# Patient Record
Sex: FEMALE | Race: WHITE | Employment: OTHER | URBAN - METROPOLITAN AREA
[De-identification: names, ages, dates, MRNs, and addresses within clinical notes are randomized per-mention and may not be internally consistent; named-entity substitution may affect disease eponyms.]

---

## 2017-02-16 ENCOUNTER — OFFICE VISIT (OUTPATIENT)
Dept: FAMILY MEDICINE CLINIC | Age: 22
End: 2017-02-16

## 2017-02-16 VITALS
RESPIRATION RATE: 18 BRPM | HEART RATE: 93 BPM | DIASTOLIC BLOOD PRESSURE: 81 MMHG | OXYGEN SATURATION: 100 % | BODY MASS INDEX: 19.32 KG/M2 | SYSTOLIC BLOOD PRESSURE: 108 MMHG | TEMPERATURE: 97.1 F | WEIGHT: 105 LBS | HEIGHT: 62 IN

## 2017-02-16 DIAGNOSIS — R23.3 BRUISES EASILY: ICD-10-CM

## 2017-02-16 DIAGNOSIS — R21 RASH: Primary | ICD-10-CM

## 2017-02-16 PROBLEM — N39.0 RECURRENT UTI: Status: ACTIVE | Noted: 2017-02-16

## 2017-02-16 PROBLEM — F31.9 BIPOLAR 1 DISORDER (HCC): Status: ACTIVE | Noted: 2017-02-16

## 2017-02-16 PROBLEM — I10 ESSENTIAL HYPERTENSION: Status: ACTIVE | Noted: 2017-02-16

## 2017-02-16 PROBLEM — F42.9 OCD (OBSESSIVE COMPULSIVE DISORDER): Status: ACTIVE | Noted: 2017-02-16

## 2017-02-16 PROBLEM — F84.0 AUTISM SPECTRUM DISORDER: Status: ACTIVE | Noted: 2017-02-16

## 2017-02-16 PROBLEM — Q62.5 DUPLICATED URINARY COLLECTING SYSTEM: Status: ACTIVE | Noted: 2017-02-16

## 2017-02-16 PROBLEM — Z87.448 H/O KIDNEY DISEASE: Status: ACTIVE | Noted: 2017-02-16

## 2017-02-16 PROBLEM — F90.2 ATTENTION DEFICIT HYPERACTIVITY DISORDER (ADHD), COMBINED TYPE: Status: ACTIVE | Noted: 2017-02-16

## 2017-02-16 PROBLEM — F21 SCHIZOTYPAL DISORDER (HCC): Status: ACTIVE | Noted: 2017-02-16

## 2017-02-16 RX ORDER — TRIAMCINOLONE ACETONIDE 1 MG/G
OINTMENT TOPICAL 2 TIMES DAILY
Qty: 30 G | Refills: 0 | Status: SHIPPED | OUTPATIENT
Start: 2017-02-16 | End: 2017-03-16 | Stop reason: SDUPTHER

## 2017-02-16 NOTE — MR AVS SNAPSHOT
Visit Information Date & Time Provider Department Dept. Phone Encounter #  
 2/16/2017  2:30 PM Ramone Figueroa MD [address] [facility] [phone] [id number] Follow-up Instructions Return in about 1 month (around 3/16/2017) for SKIN bruising . Your Appointments 3/16/2017 10:00 AM  
ROUTINE CARE with Ramone Figueroa MD  
2813 [facility] ([address]) Appt Note: Return in about 1 month (around 3/16/2017) for SKIN bruising [address] [facility] [address] [address]  
[phone]  
  
   
 [address] [facility] [address] Upcoming Health Maintenance Date Due  
 HPV AGE 9Y-34Y (1 of 3 - Female 3 Dose Series) 9/12/2004 DTaP/Tdap/Td series (1 - Tdap) 9/12/2014 PAP AKA CERVICAL CYTOLOGY 9/12/2014 INFLUENZA AGE 9 TO ADULT 8/1/2016 Allergies as of 2/16/2017  Review Complete On: 2/16/2017 By: Ramone Figueroa MD  
 No Known Allergies Current Immunizations  Never Reviewed No immunizations on file. Not reviewed this visit You Were Diagnosed With   
  
 Codes Comments Rash    -  Primary ICD-10-CM: R21 
ICD-9-CM: 782.1 Bruises easily     ICD-10-CM: R23.8 ICD-9-CM: 457. 9 Vitals BP Pulse Temp Resp Height(growth percentile) Weight(growth percentile) 108/81 93 97.1 °F (36.2 °C) (Oral) 18 5' 2\" (1.575 m) 105 lb (47.6 kg) LMP SpO2 BMI OB Status Smoking Status 02/16/2017 100% 19.2 kg/m2 Having regular periods Never Smoker Vitals History BMI and BSA Data Body Mass Index Body Surface Area  
 19.2 kg/m 2 1.44 m 2 Preferred Pharmacy Pharmacy Name Phone [facility]. AT [address]  [phone] Your Updated Medication List  
  
   
This list is accurate as of: 2/16/17  3:02 PM.  Always use your most recent med list.  
  
  
  
  
 triamcinolone acetonide 0.1 % ointment Commonly known as:  KENALOG Apply  to affected area two (2) times a day. use thin layer Prescriptions Sent to Pharmacy Refills  
 triamcinolone acetonide (KENALOG) 0.1 % ointment 0 Sig: Apply  to affected area two (2) times a day. use thin layer Class: Normal  
 Pharmacy: [address], [address] [address] Ph #: [phone] Route: Topical  
  
Follow-up Instructions Return in about 1 month (around 3/16/2017) for SKIN bruising . Introducing [address]! Marcio Adhikari introduces Steps[employer] patient portal. Now you can access parts of your medical record, email your doctor's office, and request medication refills online. 1. In your internet browser, go to [email or web address] 2. Click on the First Time User? Click Here link in the Sign In box. You will see the New Member Sign Up page. 3. Enter your Stepsss Access Code exactly as it appears below. You will not need to use this code after youve completed the sign-up process. If you do not sign up before the expiration date, you must request a new code. · Stepsss Access Code: [email or web address] Expires: 5/17/2017  2:19 PM 
 
4. Enter the last four digits of your Social Security Number (xxxx) and Date of Birth (mm/dd/yyyy) as indicated and click Submit. You will be taken to the next sign-up page. 5. Create a [employer] ID. This will be your Stepsss login ID and cannot be changed, so think of one that is secure and easy to remember. 6. Create a Stepsss password. You can change your password at any time. 7. Enter your Password Reset Question and Answer. This can be used at a later time if you forget your password. 8. Enter your e-mail address. You will receive e-mail notification when new information is available in [address] 9. Click Sign Up. You can now view and download portions of your medical record. 10. Click the Download Summary menu link to download a portable copy of your medical information. If you have questions, please visit the Frequently Asked Questions section of the [facility] website. Remember, [facility] is NOT to be used for urgent needs. For medical emergencies, dial 911. Now available from your iPhone and Android! Please provide this summary of care documentation to your next provider. Your primary care clinician is listed as Janet Nicole. If you have any questions after today's visit, please call [phone].

## 2017-02-16 NOTE — PROGRESS NOTES
HISTORY OF PRESENT ILLNESS  Emperatriz Velez is a 19 y.o. female. HPI Comments: Patient is here to establish care. She is currently under the care of [facility]. She has been placed on medication from her kidney specialists. She has recently been started on buspirone. She has developed some bruises all over her leg and body. The appear to be dark in color and are slowly starting to faint off. He mother mentions that she may have used a body wash which may have triggered the rash on her back and buttocks. This rash is typical of contact dermatitis and patient herself mentions she likes using scented lotions and soaps which make her skin dry. I have advised for now to use a topical steroid on the crusted lesions and to use a mild moisturizing cream with not to much chemicals. Establish Care   The history is provided by the patient. This is a new problem. The problem occurs constantly. The problem has not changed since onset. Pertinent negatives include no chest pain, no abdominal pain, no headaches and no shortness of breath. Nothing aggravates the symptoms. Nothing relieves the symptoms. She has tried nothing for the symptoms. Other   The history is provided by the patient. This is a recurrent problem. The problem occurs constantly. The problem has not changed since onset. Pertinent negatives include no chest pain, no abdominal pain, no headaches and no shortness of breath. The symptoms are aggravated by stress. Nothing relieves the symptoms. She has tried nothing for the symptoms. Review of Systems   Constitutional: Negative for chills, fever and malaise/fatigue. HENT: Negative for congestion, ear discharge, ear pain, hearing loss and sore throat. Eyes: Negative for blurred vision, double vision and discharge. Respiratory: Negative for cough, sputum production, shortness of breath and wheezing. Cardiovascular: Negative for chest pain, palpitations and leg swelling. Gastrointestinal: Negative for abdominal pain, constipation and diarrhea. Genitourinary: Negative for dysuria. Musculoskeletal: Negative for joint pain. Skin: Positive for itching and rash. Multiple bruises on the legs. Small crusted lesions on back and buttocks   Neurological: Negative for dizziness, tremors, weakness and headaches. Endo/Heme/Allergies: Negative for environmental allergies and polydipsia. Bruises/bleeds easily. Psychiatric/Behavioral: The patient is not nervous/anxious and does not have insomnia. Physical Exam   Constitutional: She is oriented to person, place, and time. She appears well-developed and well-nourished. No distress. HENT:   Head: Normocephalic and atraumatic. Right Ear: External ear normal.   Left Ear: External ear normal.   Mouth/Throat: Oropharynx is clear and moist.   Eyes: EOM are normal. Pupils are equal, round, and reactive to light. No scleral icterus. Neck: Normal range of motion. No thyromegaly present. Cardiovascular: Normal rate, regular rhythm and normal heart sounds. Pulmonary/Chest: Effort normal and breath sounds normal. No respiratory distress. She has no wheezes. Abdominal: Soft. Bowel sounds are normal. She exhibits no distension. There is no tenderness. Lymphadenopathy:     She has no cervical adenopathy. Neurological: She is alert and oriented to person, place, and time. Skin: Rash noted. There is erythema. Patient has bruises on her legs   Psychiatric: She has a normal mood and affect.        ASSESSMENT and PLAN  Skin bruises :  - Reviewed medications with patient   - Blood work has been ordered   - Patient does not move her legs around at night    Contact dermatitis :   - Wash your skin  with lots of water to remove any traces of the irritant that may remain on the skin.    -You should avoid further exposure to known irritants or allergens.   -Emollients or moisturizers to keep the skin moist, and also help skin repair itself.s.   - Begin to use Corticosteroid skin creams or ointments may reduce inflammation, do not over use topical take a break after five continuous days of use. - Alternative to steroids can be tacrolimus cream  -In severe cases, corticosteroid pills may be needed. You will start them on a high dose, which is tapered gradually over about 12 days.   -Wet dressings and soothing anti-itch (antipruritic) or drying lotions may be recommended to reduce other symptoms.

## 2017-02-16 NOTE — PROGRESS NOTES
Chief Complaint   Patient presents with   [address]    Other     Bruises on her legs     A couple of months ago. 1. Have you been to the ER, urgent care clinic since your last visit? Hospitalized since your last visit? No    2. Have you seen or consulted any other health care providers outside of the [address] [place] [address] since your last visit? Include any pap smears or colon screening.  No

## 2017-02-17 LAB
ALBUMIN SERPL-MCNC: 4 G/DL (ref 3.5–5.5)
ALBUMIN/GLOB SERPL: 1.7 {RATIO} (ref 1.1–2.5)
ALP SERPL-CCNC: 54 IU/L (ref 39–117)
ALT SERPL-CCNC: 12 IU/L (ref 0–32)
AST SERPL-CCNC: 20 IU/L (ref 0–40)
BASOPHILS # BLD AUTO: 0 X10E3/UL (ref 0–0.2)
BASOPHILS NFR BLD AUTO: 1 %
BILIRUB SERPL-MCNC: <0.2 MG/DL (ref 0–1.2)
BUN SERPL-MCNC: 19 MG/DL (ref 6–20)
BUN/CREAT SERPL: 11 (ref 8–20)
CALCIUM SERPL-MCNC: 8.7 MG/DL (ref 8.7–10.2)
CHLORIDE SERPL-SCNC: 102 MMOL/L (ref 96–106)
CO2 SERPL-SCNC: 23 MMOL/L (ref 18–29)
CREAT SERPL-MCNC: 1.68 MG/DL (ref 0.57–1)
EOSINOPHIL # BLD AUTO: 0.1 X10E3/UL (ref 0–0.4)
EOSINOPHIL NFR BLD AUTO: 2 %
ERYTHROCYTE [DISTWIDTH] IN BLOOD BY AUTOMATED COUNT: 13.6 % (ref 12.3–15.4)
GLOBULIN SER CALC-MCNC: 2.4 G/DL (ref 1.5–4.5)
GLUCOSE SERPL-MCNC: 74 MG/DL (ref 65–99)
HCT VFR BLD AUTO: 37.2 % (ref 34–46.6)
HGB BLD-MCNC: 12.4 G/DL (ref 11.1–15.9)
IMM GRANULOCYTES # BLD: 0 X10E3/UL (ref 0–0.1)
IMM GRANULOCYTES NFR BLD: 0 %
LYMPHOCYTES # BLD AUTO: 2.1 X10E3/UL (ref 0.7–3.1)
LYMPHOCYTES NFR BLD AUTO: 38 %
MCH RBC QN AUTO: 28.9 PG (ref 26.6–33)
MCHC RBC AUTO-ENTMCNC: 33.3 G/DL (ref 31.5–35.7)
MCV RBC AUTO: 87 FL (ref 79–97)
MONOCYTES # BLD AUTO: 0.5 X10E3/UL (ref 0.1–0.9)
MONOCYTES NFR BLD AUTO: 8 %
NEUTROPHILS # BLD AUTO: 2.9 X10E3/UL (ref 1.4–7)
NEUTROPHILS NFR BLD AUTO: 51 %
PLATELET # BLD AUTO: 197 X10E3/UL (ref 150–379)
POTASSIUM SERPL-SCNC: 4.6 MMOL/L (ref 3.5–5.2)
PROT SERPL-MCNC: 6.4 G/DL (ref 6–8.5)
RBC # BLD AUTO: 4.29 X10E6/UL (ref 3.77–5.28)
SODIUM SERPL-SCNC: 141 MMOL/L (ref 134–144)
TSH SERPL DL<=0.005 MIU/L-ACNC: 4.72 UIU/ML (ref 0.45–4.5)
WBC # BLD AUTO: 5.6 X10E3/UL (ref 3.4–10.8)

## 2017-03-16 ENCOUNTER — OFFICE VISIT (OUTPATIENT)
Dept: FAMILY MEDICINE CLINIC | Age: 22
End: 2017-03-16

## 2017-03-16 ENCOUNTER — HOSPITAL ENCOUNTER (OUTPATIENT)
Dept: LAB | Age: 22
Discharge: HOME | End: 2017-03-16
Payer: MEDICAID

## 2017-03-16 VITALS
HEIGHT: 62 IN | SYSTOLIC BLOOD PRESSURE: 109 MMHG | RESPIRATION RATE: 16 BRPM | WEIGHT: 110 LBS | DIASTOLIC BLOOD PRESSURE: 74 MMHG | TEMPERATURE: 97.4 F | HEART RATE: 83 BPM | BODY MASS INDEX: 20.24 KG/M2 | OXYGEN SATURATION: 96 %

## 2017-03-16 DIAGNOSIS — R79.89 ELEVATED TSH: ICD-10-CM

## 2017-03-16 DIAGNOSIS — Z87.898 HISTORY OF BRUISING EASILY: ICD-10-CM

## 2017-03-16 DIAGNOSIS — L30.9 ECZEMA, UNSPECIFIED TYPE: Primary | ICD-10-CM

## 2017-03-16 LAB
T4 FREE SERPL-MCNC: 1 NG/DL (ref 0.7–1.5)
TSH SERPL DL<=0.05 MIU/L-ACNC: 3.09 UIU/ML (ref 0.36–3.74)

## 2017-03-16 PROCEDURE — 86376 MICROSOMAL ANTIBODY EACH: CPT | Performed by: FAMILY MEDICINE

## 2017-03-16 PROCEDURE — 84480 ASSAY TRIIODOTHYRONINE (T3): CPT | Performed by: FAMILY MEDICINE

## 2017-03-16 PROCEDURE — 84439 ASSAY OF FREE THYROXINE: CPT | Performed by: FAMILY MEDICINE

## 2017-03-16 PROCEDURE — 84443 ASSAY THYROID STIM HORMONE: CPT | Performed by: FAMILY MEDICINE

## 2017-03-16 RX ORDER — TRIAMCINOLONE ACETONIDE 1 MG/G
OINTMENT TOPICAL 2 TIMES DAILY
Qty: 30 G | Refills: 0 | Status: SHIPPED | OUTPATIENT
Start: 2017-03-16 | End: 2017-07-24

## 2017-03-16 NOTE — PROGRESS NOTES
Monet Medley is a 19 y.o. female presents to office for skin bruising. 1. Have you been to the ER, urgent care clinic or hospitalized since your last visit? no  2. Have you seen any other providers outside of [facility] since your last visit? no  3.  Have you had a Flu shot this year? no       Health Maintenance items with a due date reviewed with patient:  Health Maintenance Due   Topic Date Due    HPV AGE 9Y-26Y (1 of 3 - Female 3 Dose Series) 09/12/2004    DTaP/Tdap/Td series (1 - Tdap) 09/12/2014    PAP AKA CERVICAL CYTOLOGY  09/12/2014    INFLUENZA AGE 9 TO ADULT  08/01/2016

## 2017-03-16 NOTE — MR AVS SNAPSHOT
Visit Information Date & Time Provider Department Dept. Phone Encounter #  
 3/16/2017 10:00 AM Shantelle Adrian MD [address] [facility] [phone] [id number] Follow-up Instructions Return in about 2 months (around 5/16/2017) for follow up. Your Appointments 3/30/2017  2:15 PM  
New Patient with Mala Abel MD  
[facility]. [facility]) Appt Note: recurrent UTI/ prior obsturction/ prior reconstruction [facility]/ Dr Miles Mendoza/ [place] [address] [address]  
[phone]  
  
   
 [place] [id number] Upcoming Health Maintenance Date Due  
 HPV AGE 9Y-34Y (1 of 3 - Female 3 Dose Series) 9/12/2004 DTaP/Tdap/Td series (1 - Tdap) 9/12/2014 PAP AKA CERVICAL CYTOLOGY 9/12/2014 INFLUENZA AGE 9 TO ADULT 8/1/2016 Allergies as of 3/16/2017  Review Complete On: 2/16/2017 By: Shantelle Adrian MD  
 No Known Allergies Current Immunizations  Never Reviewed No immunizations on file. Not reviewed this visit You Were Diagnosed With   
  
 Codes Comments Eczema, unspecified type    -  Primary ICD-10-CM: L30.9 ICD-9-CM: 692.9 Elevated TSH     ICD-10-CM: R94.6 ICD-9-CM: 794.5 History of bruising easily     ICD-10-CM: Z86.2 ICD-9-CM: V13.9 Vitals BP Pulse Temp Resp Height(growth percentile) Weight(growth percentile) 109/74 (BP 1 Location: Left arm, BP Patient Position: Sitting) 83 97.4 °F (36.3 °C) (Oral) 16 5' 2\" (1.575 m) 110 lb (49.9 kg) LMP SpO2 BMI OB Status Smoking Status 02/16/2017 96% 20.12 kg/m2 Having regular periods Never Smoker Vitals History BMI and BSA Data Body Mass Index Body Surface Area  
 20.12 kg/m 2 1.48 m 2 Preferred Pharmacy Pharmacy Name Phone [facility]. AT [facility]   [phone] Your Updated Medication List  
  
   
This list is accurate as of: 3/16/17 10:33 AM.  Always use your most recent med list.  
  
  
  
  
 triamcinolone acetonide 0.1 % ointment Commonly known as:  KENALOG Apply  to affected area two (2) times a day. use thin layer Prescriptions Sent to Pharmacy Refills  
 triamcinolone acetonide (KENALOG) 0.1 % ointment 0 Sig: Apply  to affected area two (2) times a day. use thin layer Class: Normal  
 Pharmacy: [address], [address] [address] Ph #: [phone] Route: Topical  
  
We Performed the Following NM COLLECTION VENOUS BLOOD,VENIPUNCTURE [id number] CPT(R)] Follow-up Instructions Return in about 2 months (around 5/16/2017) for follow up. Patient Instructions Please follow up in 2 months with Dr. Tereza Mc. Once your labs come back we will you. Introducing [facility]! [facility] introduces [employer] patient portal. Now you can access parts of your medical record, email your doctor's office, and request medication refills online. 1. In your internet browser, go to [email or web address] 2. Click on the First Time User? Click Here link in the Sign In box. You will see the New Member Sign Up page. 3. Enter your [employer] Access Code exactly as it appears below. You will not need to use this code after youve completed the sign-up process. If you do not sign up before the expiration date, you must request a new code. · [employer] Access Code: [email or web address] Expires: 5/17/2017  3:19 PM 
 
4. Enter the last four digits of your Social Security Number (xxxx) and Date of Birth (mm/dd/yyyy) as indicated and click Submit. You will be taken to the next sign-up page. 5. Create a [employer] ID. This will be your [employer] login ID and cannot be changed, so think of one that is secure and easy to remember. 6. Create a [employer] password. You can change your password at any time. 7. Enter your Password Reset Question and Answer. This can be used at a later time if you forget your password. 8. Enter your e-mail address. You will receive e-mail notification when new information is available in [address] 9. Click Sign Up. You can now view and download portions of your medical record. 10. Click the Download Summary menu link to download a portable copy of your medical information. If you have questions, please visit the Frequently Asked Questions section of the [employer] website. Remember, [employer] is NOT to be used for urgent needs. For medical emergencies, dial 911. Now available from your iPhone and Android! Please provide this summary of care documentation to your next provider. Your primary care clinician is listed as Janet Cabrera. If you have any questions after today's visit, please call [phone].

## 2017-03-16 NOTE — PROGRESS NOTES
HISTORY OF PRESENT ILLNESS  Ken Bustillo is a 19 y.o. female. HPI Comments: Patients skin appears to be looking better after she has changed from perfume creams to simple cream. She just got a kitten which has caused her to have some mild scratch marks. I ave advised mom to use Aquaphor on these marks. Her mother is concerned about the bruises and I have advised her that her TSH is slightly high and will need to repated today along with other thyroid testing. Her mother mentions thyroid disorder runs in the family and it has always been high. She is on psych medications and I have explained that it is important for her psychiatrist to know about these lab results as well. She verbalizes an understanding. Skin Problem   The history is provided by the patient. This is a recurrent problem. The problem occurs constantly. The problem has been gradually improving. Pertinent negatives include no chest pain, no abdominal pain, no headaches and no shortness of breath. The symptoms are aggravated by stress (thyroid elevation). Nothing relieves the symptoms. She has tried nothing for the symptoms. Thyroid Problem   The history is provided by the patient. This is a chronic problem. The problem occurs constantly. The problem has not changed since onset. Pertinent negatives include no chest pain, no abdominal pain, no headaches and no shortness of breath. Nothing aggravates the symptoms. Nothing relieves the symptoms. She has tried nothing for the symptoms. Review of Systems   Constitutional: Negative for chills, diaphoresis, fever and malaise/fatigue. HENT: Negative for congestion, ear pain, hearing loss and sore throat. Eyes: Negative for blurred vision, pain and discharge. Respiratory: Negative for cough, sputum production, shortness of breath and wheezing. Cardiovascular: Negative for chest pain, palpitations and leg swelling.    Gastrointestinal: Negative for abdominal pain, constipation, diarrhea, nausea and vomiting. Genitourinary: Negative for dysuria. Musculoskeletal: Negative for joint pain and myalgias. Skin:        Cat scratch marks on skin of arms    Neurological: Negative for dizziness, focal weakness, weakness and headaches. Endo/Heme/Allergies: Negative for environmental allergies. Psychiatric/Behavioral: Negative for depression and substance abuse. The patient is not nervous/anxious. Visit Vitals    /74 (BP 1 Location: Left arm, BP Patient Position: Sitting)    Pulse 83    Temp 97.4 °F (36.3 °C) (Oral)    Resp 16    Ht 5' 2\" (1.575 m)    Wt 110 lb (49.9 kg)    SpO2 96%    BMI 20.12 kg/m2       Physical Exam   Constitutional: She is oriented to person, place, and time. She appears well-developed and well-nourished. No distress. HENT:   Head: Normocephalic and atraumatic. Right Ear: External ear normal.   Left Ear: External ear normal.   Mouth/Throat: Oropharynx is clear and moist.   Eyes: EOM are normal. Pupils are equal, round, and reactive to light. No scleral icterus. Neck: Normal range of motion. No thyromegaly present. Cardiovascular: Normal rate, regular rhythm and normal heart sounds. Pulmonary/Chest: Effort normal and breath sounds normal.   Abdominal: Soft. Bowel sounds are normal. She exhibits no distension. There is no tenderness. Lymphadenopathy:     She has no cervical adenopathy. Neurological: She is alert and oriented to person, place, and time. Psychiatric: She has a normal mood and affect.        ASSESSMENT and PLAN  Eczema:  - Blood work ordered   - Continue to use mild moisturzing cream  Such as cerave, cetaphil or lubiderm , and mild soaps such as dove  - Apply steroid cream to affected areas twice a day for 5-7 days , take a break in between  - Please begin elidel cream  - Consider dermatology referral if not improving    Elevated TSH:  - Blood work to  been repeated

## 2017-03-17 LAB
T3 SERPL-MCNC: 76 NG/DL (ref 71–180)
THYROGLOB AB SERPL-ACNC: <1 IU/ML (ref 0–0.9)
THYROPEROXIDASE AB SERPL-ACNC: 8 IU/ML (ref 0–34)

## 2017-05-17 ENCOUNTER — OFFICE VISIT (OUTPATIENT)
Dept: FAMILY MEDICINE CLINIC | Age: 22
End: 2017-05-17

## 2017-05-17 VITALS
RESPIRATION RATE: 16 BRPM | BODY MASS INDEX: 20.72 KG/M2 | HEIGHT: 62 IN | DIASTOLIC BLOOD PRESSURE: 66 MMHG | TEMPERATURE: 98 F | OXYGEN SATURATION: 95 % | WEIGHT: 112.6 LBS | HEART RATE: 83 BPM | SYSTOLIC BLOOD PRESSURE: 102 MMHG

## 2017-05-17 DIAGNOSIS — R05.9 COUGH: Primary | ICD-10-CM

## 2017-05-17 DIAGNOSIS — J01.10 ACUTE NON-RECURRENT FRONTAL SINUSITIS: ICD-10-CM

## 2017-05-17 RX ORDER — AZITHROMYCIN 250 MG/1
TABLET, FILM COATED ORAL
Qty: 6 TAB | Refills: 0 | Status: SHIPPED | OUTPATIENT
Start: 2017-05-17 | End: 2017-05-22

## 2017-05-17 RX ORDER — BENZONATATE 100 MG/1
100 CAPSULE ORAL
Qty: 21 CAP | Refills: 0 | Status: SHIPPED | OUTPATIENT
Start: 2017-05-17 | End: 2017-05-24

## 2017-05-17 NOTE — PROGRESS NOTES
HISTORY OF PRESENT ILLNESS  Celina White is a 19 y.o. female. Cough   The history is provided by the patient. This is a new problem. The current episode started more than 1 week ago. The problem occurs constantly. The problem has been gradually worsening. Associated symptoms include headaches. Pertinent negatives include no chest pain, no abdominal pain and no shortness of breath. The symptoms are aggravated by stress. Nothing relieves the symptoms. She has tried nothing for the symptoms. Review of Systems   Constitutional: Positive for chills. Negative for fever and malaise/fatigue. HENT: Positive for congestion and sore throat. Negative for ear pain, nosebleeds and tinnitus. Eyes: Negative for blurred vision, double vision, pain and discharge. Respiratory: Positive for cough. Negative for sputum production, shortness of breath and wheezing. Cardiovascular: Negative for chest pain, palpitations and leg swelling. Gastrointestinal: Negative for abdominal pain, diarrhea, nausea and vomiting. Genitourinary: Negative for dysuria and urgency. Musculoskeletal: Negative for joint pain. Neurological: Positive for headaches. Negative for dizziness, tingling and weakness. Psychiatric/Behavioral: The patient is not nervous/anxious. Visit Vitals    /66    Pulse 83    Temp 98 °F (36.7 °C)    Resp 16    Ht 5' 2\" (1.575 m)    Wt 112 lb 9.6 oz (51.1 kg)    SpO2 95%    BMI 20.59 kg/m2       Physical Exam   Constitutional: She is oriented to person, place, and time. She appears well-developed and well-nourished. No distress. HENT:   Head: Normocephalic and atraumatic. Nose: Mucosal edema and rhinorrhea present. Right sinus exhibits maxillary sinus tenderness and frontal sinus tenderness. Left sinus exhibits maxillary sinus tenderness and frontal sinus tenderness. Mouth/Throat: Posterior oropharyngeal erythema present.    Eyes: EOM are normal. Pupils are equal, round, and reactive to light. No scleral icterus. Neck: Normal range of motion. No thyromegaly present. Cardiovascular: Normal rate, regular rhythm and normal heart sounds. Pulmonary/Chest: Effort normal and breath sounds normal. She has no wheezes. Abdominal: Soft. Bowel sounds are normal. She exhibits no distension. There is no tenderness. Lymphadenopathy:     She has no cervical adenopathy. Neurological: She is alert and oriented to person, place, and time. Psychiatric: She has a normal mood and affect. ASSESSMENT and PLAN  Sinusitis :  1) Ok to take tylenol / motrin to relieve pain. 2) Please finish course of antibiotics , explained side effects and patient verbalizes understanding. 3) Mucolytic's such as guaifenesin which can thin out the mucous. 4) Use nasal steroid inhaler and anti-allergy medication. 5) Can use nasal saline spray or Neti-pot. 6) Please keep a humidifier in your bedroom. 7) Patient instructions and information on diagnosis to be handed out.

## 2017-05-17 NOTE — PROGRESS NOTES
Chief Complaint   Patient presents with    Cough     Pt states her cough has not gotten any better. 1. Have you been to the ER, urgent care clinic since your last visit? Hospitalized since your last visit? No    2. Have you seen or consulted any other health care providers outside of the [address] [place] [address] since your last visit? Include any pap smears or colon screening.  No

## 2017-07-24 PROBLEM — Q63.9 CONGENITAL ANOMALY OF KIDNEY: Status: ACTIVE | Noted: 2017-07-24

## 2017-08-03 ENCOUNTER — OFFICE VISIT (OUTPATIENT)
Dept: FAMILY MEDICINE CLINIC | Age: 22
End: 2017-08-03

## 2017-08-03 VITALS
HEART RATE: 78 BPM | WEIGHT: 117 LBS | BODY MASS INDEX: 21.53 KG/M2 | TEMPERATURE: 98.8 F | RESPIRATION RATE: 16 BRPM | SYSTOLIC BLOOD PRESSURE: 105 MMHG | HEIGHT: 62 IN | OXYGEN SATURATION: 100 % | DIASTOLIC BLOOD PRESSURE: 80 MMHG

## 2017-08-03 DIAGNOSIS — Z12.4 PAP SMEAR FOR CERVICAL CANCER SCREENING: ICD-10-CM

## 2017-08-03 DIAGNOSIS — N92.6 IRREGULAR PERIODS/MENSTRUAL CYCLES: Primary | ICD-10-CM

## 2017-08-03 NOTE — PROGRESS NOTES
HISTORY OF PRESENT ILLNESS  Rashel Schroeder is a 19 y.o. female. HPI Comments:  Patient is here today as she is concerned that her last cycle only lasted 2 days and normal it is 5 days and comes every month. There is a concern due to ovarian / endometrial cancer in the family. She is due to have a pap smear and would like to have one doen today as her last one was when she was 11. I will do a PAP smear in the office and I have advised her to monitor for her next cycle and how long it lasts. Irregular Menses   The history is provided by the patient. This is a new problem. The current episode started more than 1 week ago. The problem occurs constantly. The problem has not changed since onset. Pertinent negatives include no chest pain, no abdominal pain, no headaches and no shortness of breath. The symptoms are aggravated by stress. Nothing relieves the symptoms. She has tried nothing for the symptoms. Review of Systems   Constitutional: Negative for chills, fever and malaise/fatigue. HENT: Negative for ear pain, nosebleeds and sore throat. Eyes: Negative for blurred vision, double vision and pain. Respiratory: Negative for cough, sputum production, shortness of breath and wheezing. Cardiovascular: Negative for chest pain, palpitations and leg swelling. Gastrointestinal: Negative for abdominal pain, constipation, nausea and vomiting. Genitourinary: Negative for dysuria, frequency and hematuria. Musculoskeletal: Negative for joint pain and myalgias. Neurological: Negative for dizziness, tingling, tremors, focal weakness, weakness and headaches. Psychiatric/Behavioral: Negative for depression. The patient is nervous/anxious.       Visit Vitals    /80 (BP 1 Location: Left arm, BP Patient Position: Sitting)    Pulse 78    Temp 98.8 °F (37.1 °C) (Oral)    Resp 16    Ht 5' 2\" (1.575 m)    Wt 117 lb (53.1 kg)    SpO2 100%    BMI 21.4 kg/m2       Physical Exam   Constitutional: She is oriented to person, place, and time. She appears well-developed and well-nourished. No distress. HENT:   Head: Normocephalic and atraumatic. Right Ear: External ear normal.   Left Ear: External ear normal.   Mouth/Throat: Oropharynx is clear and moist.   Eyes: EOM are normal. Pupils are equal, round, and reactive to light. No scleral icterus. Neck: Normal range of motion. No thyromegaly present. Cardiovascular: Normal rate, regular rhythm and normal heart sounds. Pulmonary/Chest: Effort normal and breath sounds normal. No respiratory distress. She has no wheezes. Abdominal: Soft. Bowel sounds are normal. She exhibits no distension. There is no tenderness. Lymphadenopathy:     She has no cervical adenopathy. Neurological: She is alert and oriented to person, place, and time. Psychiatric: She has a normal mood and affect.        ASSESSMENT and PLAN  Irregular cycle:  - Please monitor for next cycle and the length   - Pap smear in office as patient is due

## 2017-08-07 LAB
BACTERIAL VAGINOSIS, NAA: NEGATIVE
CANDIDA ALBICANS, NAA, 180056: NEGATIVE
CANDIDA GLABRATA, NAA, 180057: NEGATIVE
CANDIDA KRUSEI, NAA, 180016: NEGATIVE
CHLAMYDIA TRACHOMATIS, NAA, 180097: NEGATIVE
HPV HIGH RISK, 507303: NEGATIVE
NEISSERIA GONORRHOEAE, NAA, 180104: NEGATIVE
TRICH VAG BY NAA, 180087: NEGATIVE

## 2017-08-08 LAB — PAP IMAGE GUIDED, 8900296: ABNORMAL

## 2017-08-09 ENCOUNTER — TELEPHONE (OUTPATIENT)
Dept: FAMILY MEDICINE CLINIC | Age: 22
End: 2017-08-09

## 2017-08-09 NOTE — TELEPHONE ENCOUNTER
----- Message from Nisreen Sunshine MD sent at 8/8/2017  2:27 PM EDT -----  April General her specimen for pap came back unsatisfactory and she will need to come back for a pap only.  Please inform her the rest of testing being the HPV and nuswab was normal.

## 2017-08-14 PROBLEM — N12 PYELONEPHRITIS: Status: ACTIVE | Noted: 2017-08-14

## 2017-08-17 PROBLEM — A41.9 SEPSIS (HCC): Status: ACTIVE | Noted: 2017-08-17

## 2017-08-17 PROBLEM — J18.9 PNEUMONIA: Status: ACTIVE | Noted: 2017-08-17

## 2017-08-22 ENCOUNTER — OFFICE VISIT (OUTPATIENT)
Dept: FAMILY MEDICINE CLINIC | Age: 22
End: 2017-08-22

## 2017-08-22 VITALS
DIASTOLIC BLOOD PRESSURE: 73 MMHG | HEART RATE: 83 BPM | WEIGHT: 113 LBS | OXYGEN SATURATION: 95 % | SYSTOLIC BLOOD PRESSURE: 94 MMHG | TEMPERATURE: 95.5 F | BODY MASS INDEX: 20.8 KG/M2 | RESPIRATION RATE: 16 BRPM | HEIGHT: 62 IN

## 2017-08-22 DIAGNOSIS — Z09 HOSPITAL DISCHARGE FOLLOW-UP: ICD-10-CM

## 2017-08-22 DIAGNOSIS — N39.0 URINARY TRACT INFECTION WITH HEMATURIA, SITE UNSPECIFIED: Primary | ICD-10-CM

## 2017-08-22 DIAGNOSIS — R31.9 URINARY TRACT INFECTION WITH HEMATURIA, SITE UNSPECIFIED: Primary | ICD-10-CM

## 2017-08-22 DIAGNOSIS — R79.89 ELEVATED SERUM CREATININE: ICD-10-CM

## 2017-08-22 DIAGNOSIS — N12 PYELONEPHRITIS: ICD-10-CM

## 2017-08-22 DIAGNOSIS — R87.615 ENCOUNTER FOR REPEAT PAP SMEAR DUE TO PREVIOUS INSUFFICIENT CERVICAL CELLS: ICD-10-CM

## 2017-08-22 DIAGNOSIS — J18.9 PNEUMONIA OF RIGHT LOWER LOBE DUE TO INFECTIOUS ORGANISM: ICD-10-CM

## 2017-08-22 LAB
BILIRUB UR QL STRIP: NEGATIVE
GLUCOSE UR-MCNC: NORMAL MG/DL
KETONES P FAST UR STRIP-MCNC: NEGATIVE MG/DL
PH UR STRIP: 7 [PH] (ref 4.6–8)
PROT UR QL STRIP: NORMAL MG/DL
SP GR UR STRIP: 1.02 (ref 1–1.03)
UA UROBILINOGEN AMB POC: NORMAL (ref 0.2–1)
URINALYSIS CLARITY POC: NORMAL
URINALYSIS COLOR POC: YELLOW
URINE BLOOD POC: NORMAL
URINE LEUKOCYTES POC: NEGATIVE
URINE NITRITES POC: NEGATIVE

## 2017-08-22 NOTE — LETTER
8/22/2017 9:48 AM 
 
Ms. Crystal Lee [address] [address] [address] [address] Dear Crystal Rosa: 
 
Please find your most recent results below. Resulted Orders AMB POC URINALYSIS DIP STICK AUTO W/ MICRO Result Value Ref Range Color (UA POC) Yellow Clarity (UA POC) Cloudy Glucose (UA POC)  Negative Bilirubin (UA POC) Negative Negative Ketones (UA POC) Negative Negative Specific gravity (UA POC) 1.020 1.001 - 1.035 Blood (UA POC) 2+ Negative pH (UA POC) 7.0 4.6 - 8.0 Protein (UA POC) 2+ Negative mg/dL Urobilinogen (UA POC) 0.2 mg/dL 0.2 - 1 Nitrites (UA POC) Negative Negative Leukocyte esterase (UA POC) Negative Negative

## 2017-08-22 NOTE — PROGRESS NOTES
Patient is here for hospital f/u for kidney infection and pneumonia. Patient was given a course of antibiotics. Patient needs a referral to urologist per er note. 1. Have you been to the ER, urgent care clinic since your last visit? Hospitalized since your last visit?no    2. Have you seen or consulted any other health care providers outside of the [address] [place] [address] since your last visit? Include any pap smears or colon screening.  no

## 2017-08-22 NOTE — PROGRESS NOTES
HISTORY OF PRESENT ILLNESS  Sana Contreras is a 19 y.o. female. HPI Comments: Patient is here as a hospital follow up and she was admitted on 8/14 and d/c on 8/17. 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 She went to the emergency room because of  flank pain and fever. She was diagnosed with a UTI and pyelonephritis. A renal US was done that demonstrated presence of hydronephrosis. Her creatinine was elevated at 2.1 Urology was consulted. IV antibiotics were provided. (IV rocephin) and IV fluids were maintained. Cozaar was held due to renal failure. Coreg was provided for BP control in the interim. Urology saw the patient as a consult and recommend that she have a stent placed but the family refused at that time stating that they will discuss this with outpatient urologist whom patient sees. During her hospital course her creatinine came down to 1.6 , she was given IVF and she felt better and started to eat as well. A chest Xray done on admission shows an infiltrate at the right base. And IV azithromycin was started for treatment of pneumonia. Her cough and fever did improve with treatment . On discharge she was advised to finish antibiotics and follow up with PCP and urology as an outpatient. Today I will order a urine dipstick and some blood work to check her creatinine level. She does have an appointment with her urologist today and nephrologist tomorrow to discuss the stent placement. Patient is feeling better today and still has a course of antibiotics to take which will be completed on Saturday. Patient would also like to have her PAP repeated today as there was insufficient cells collected last time and I will do this in office today. Hospital Follow Up   The history is provided by the patient. This is a new problem. The current episode started more than 1 week ago. The problem occurs constantly.  The problem has been gradually improving. Pertinent negatives include no chest pain, no abdominal pain, no headaches and no shortness of breath. Nothing aggravates the symptoms. Nothing relieves the symptoms. Treatments tried: hospitalization  The treatment provided moderate relief. Review of Systems   Constitutional: Negative for chills, fever and malaise/fatigue. HENT: Negative for congestion, ear pain, hearing loss and sore throat. Eyes: Negative for blurred vision, double vision, pain and discharge. Respiratory: Negative for cough, sputum production, shortness of breath and wheezing. Cardiovascular: Negative for chest pain, palpitations and leg swelling. Gastrointestinal: Negative for abdominal pain, nausea and vomiting. Genitourinary: Negative for dysuria, frequency and urgency. Musculoskeletal: Negative for joint pain and myalgias. Neurological: Negative for dizziness, focal weakness, weakness and headaches. Psychiatric/Behavioral: The patient is not nervous/anxious. Visit Vitals    BP 94/73    Pulse 83    Temp 95.5 °F (35.3 °C) (Oral)    Resp 16    Ht 5' 2\" (1.575 m)    Wt 113 lb (51.3 kg)    SpO2 95%    BMI 20.67 kg/m2       Physical Exam   Constitutional: She is oriented to person, place, and time. She appears well-developed and well-nourished. No distress. HENT:   Head: Normocephalic and atraumatic. Right Ear: External ear normal.   Left Ear: External ear normal.   Mouth/Throat: Oropharynx is clear and moist. No oropharyngeal exudate. Eyes: EOM are normal. Pupils are equal, round, and reactive to light. No scleral icterus. Neck: Normal range of motion. Cardiovascular: Normal rate, regular rhythm and normal heart sounds. Pulmonary/Chest: Effort normal. No respiratory distress. She has no wheezes. Abdominal: Soft. Bowel sounds are normal. She exhibits no distension. There is no tenderness.    Genitourinary: Vagina normal and uterus normal.   Lymphadenopathy:     She has no cervical adenopathy. Neurological: She is alert and oriented to person, place, and time. Psychiatric: She has a normal mood and affect. ASSESSMENT and PLAN  UTI :  1) Urine dipstick to be ordered in office today, results to be discussed with patient in office. 2) Please finish antibiotic course , side effects of medication explained to patient, patient verbalizes understanding. 3) Drink plenty of water, can also drink cranberry juice. 4) Avoid bladder irritants such as coffee, alcohol, soft drinks, citrus juices. 5) Can apply a heating pad to lower abdomen to minimize bladder pressure and discomfort. 6) Wipe from front to back. 7) Empty your bladder every half hour to one hour and after intercourse. 8) Avoid irritating feminine products at this time such as douches and powders.     Elevated serum creatinine:  - Repeat value     Pneumonia :  - Please finish antibiotics   - Repeat chest x-ray in 2 weeks    PAP smear repeat :  - Has been done in office

## 2017-08-23 LAB
HPV HIGH RISK, 507303: NEGATIVE
PAP IMAGE GUIDED, 8900296: NORMAL

## 2017-08-29 ENCOUNTER — LAB ONLY (OUTPATIENT)
Dept: FAMILY MEDICINE CLINIC | Age: 22
End: 2017-08-29

## 2017-08-29 DIAGNOSIS — Z01.89 ROUTINE LAB DRAW: Primary | ICD-10-CM

## 2017-08-30 LAB
A-G RATIO,AGRAT: 1.9 RATIO (ref 1.1–2.6)
ABSOLUTE LYMPHOCYTE COUNT, 10803: 2.1 K/UL (ref 1–4.8)
ALBUMIN SERPL-MCNC: 4.5 G/DL (ref 3.5–5)
ALP SERPL-CCNC: 67 U/L (ref 25–115)
ALT SERPL-CCNC: 42 U/L (ref 5–40)
ANION GAP SERPL CALC-SCNC: 19 MMOL/L
AST SERPL W P-5'-P-CCNC: 39 U/L (ref 10–37)
BASOPHILS # BLD: 0.1 K/UL (ref 0–0.2)
BASOPHILS NFR BLD: 1 % (ref 0–2)
BILIRUB SERPL-MCNC: 0.2 MG/DL (ref 0.2–1.2)
BUN SERPL-MCNC: 17 MG/DL (ref 6–22)
CALCIUM SERPL-MCNC: 9.1 MG/DL (ref 8.4–10.5)
CHLORIDE SERPL-SCNC: 99 MMOL/L (ref 98–110)
CO2 SERPL-SCNC: 23 MMOL/L (ref 20–32)
CREAT SERPL-MCNC: 1.7 MG/DL (ref 0.5–1.2)
EOSINOPHIL # BLD: 0.1 K/UL (ref 0–0.5)
EOSINOPHIL NFR BLD: 1 % (ref 0–6)
ERYTHROCYTE [DISTWIDTH] IN BLOOD BY AUTOMATED COUNT: 13.9 % (ref 10–16)
GFRAA, 66117: 44.5
GFRNA, 66118: 36.7
GLOBULIN,GLOB: 2.4 G/DL (ref 2–4)
GLUCOSE SERPL-MCNC: 64 MG/DL (ref 65–99)
GRANULOCYTES,GRANS: 66 % (ref 40–75)
HCT VFR BLD AUTO: 40.3 % (ref 35.1–46.5)
HGB BLD-MCNC: 12.7 G/DL (ref 11.7–15.5)
LYMPHOCYTES, LYMLT: 24 % (ref 27–45)
MCH RBC QN AUTO: 29 PG (ref 26–34)
MCHC RBC AUTO-ENTMCNC: 32 G/DL (ref 32–36)
MCV RBC AUTO: 91 FL (ref 80–95)
MONOCYTES # BLD: 0.7 K/UL (ref 0.1–0.9)
MONOCYTES NFR BLD: 8 % (ref 3–9)
NEUTROPHILS # BLD AUTO: 5.9 K/UL (ref 1.8–7.7)
PLATELET # BLD AUTO: 315 K/UL (ref 140–440)
PMV BLD AUTO: 10.4 FL (ref 6–10.8)
POTASSIUM SERPL-SCNC: 4.5 MMOL/L (ref 3.5–5.5)
PROT SERPL-MCNC: 6.9 G/DL (ref 6.4–8.3)
RBC # BLD AUTO: 4.41 M/UL (ref 3.8–5.2)
SODIUM SERPL-SCNC: 141 MMOL/L (ref 133–145)
WBC # BLD AUTO: 8.9 K/UL (ref 4–11)

## 2017-11-15 ENCOUNTER — OFFICE VISIT (OUTPATIENT)
Dept: FAMILY MEDICINE CLINIC | Age: 22
End: 2017-11-15

## 2017-11-15 VITALS
HEART RATE: 94 BPM | BODY MASS INDEX: 20.43 KG/M2 | RESPIRATION RATE: 18 BRPM | WEIGHT: 111 LBS | TEMPERATURE: 96.3 F | HEIGHT: 62 IN | DIASTOLIC BLOOD PRESSURE: 79 MMHG | OXYGEN SATURATION: 97 % | SYSTOLIC BLOOD PRESSURE: 110 MMHG

## 2017-11-15 DIAGNOSIS — J01.11 ACUTE RECURRENT FRONTAL SINUSITIS: Primary | ICD-10-CM

## 2017-11-15 NOTE — PROGRESS NOTES
HISTORY OF PRESENT ILLNESS  Rosie Siddiqi is a 23 y.o. female. Cough   The history is provided by the patient. This is a new problem. The current episode started more than 2 days ago. The problem occurs constantly. The problem has been gradually worsening. Associated symptoms include headaches. Pertinent negatives include no chest pain and no shortness of breath. Associated symptoms comments: Cough . The symptoms are aggravated by coughing. Nothing relieves the symptoms. She has tried nothing for the symptoms. Review of Systems   Constitutional: Negative for chills, fever and malaise/fatigue. HENT: Positive for congestion. Negative for hearing loss, sinus pain and sore throat. Eyes: Negative for blurred vision, double vision and pain. Respiratory: Positive for cough. Negative for sputum production, shortness of breath, wheezing and stridor. Cardiovascular: Negative for chest pain, palpitations and leg swelling. Genitourinary: Negative for dysuria, frequency and hematuria. Musculoskeletal: Negative for joint pain and myalgias. Neurological: Positive for headaches. Negative for tingling, seizures and weakness. Psychiatric/Behavioral: Negative for depression. The patient is not nervous/anxious. Visit Vitals    /79    Pulse 94    Temp 96.3 °F (35.7 °C) (Oral)    Resp 18    Ht 5' 2\" (1.575 m)    Wt 111 lb (50.3 kg)    SpO2 97%    BMI 20.3 kg/m2       Physical Exam   Constitutional: She is oriented to person, place, and time. She appears well-developed and well-nourished. No distress. HENT:   Head: Normocephalic and atraumatic. Right Ear: External ear normal.   Left Ear: External ear normal.   Nose: Right sinus exhibits maxillary sinus tenderness and frontal sinus tenderness. Left sinus exhibits maxillary sinus tenderness and frontal sinus tenderness. Mouth/Throat: Posterior oropharyngeal erythema present.    Eyes: EOM are normal. Pupils are equal, round, and reactive to light. No scleral icterus. Neck: Normal range of motion. No thyromegaly present. Cardiovascular: Normal rate, regular rhythm and normal heart sounds. Pulmonary/Chest: Effort normal and breath sounds normal. No respiratory distress. She has no wheezes. Abdominal: Soft. Bowel sounds are normal. She exhibits no distension. There is no tenderness. Lymphadenopathy:     She has no cervical adenopathy. Neurological: She is alert and oriented to person, place, and time. Psychiatric: She has a normal mood and affect. ASSESSMENT and PLAN  Sinusitis :  1) Ok to take tylenol / motrin to relieve pain. 2) Mucolytic's such as guaifenesin which can thin out the mucous. 3) Use nasal steroid inhaler and anti-allergy medication. 4) Can use nasal saline spray or Neti-pot. 5) Please keep a humidifier in your bedroom. 6) Patient instructions and information on diagnosis to be handed out.

## 2017-11-15 NOTE — PROGRESS NOTES
Patient is here for \"deep\" cough has been going for 4 days. Denies body aches, fever and chills and sore throat. 1. Have you been to the ER, urgent care clinic since your last visit? Hospitalized since your last visit?no    2. Have you seen or consulted any other health care providers outside of the [address] [place] [address] since your last visit? Include any pap smears or colon screening.  no

## 2018-03-30 ENCOUNTER — OFFICE VISIT (OUTPATIENT)
Dept: FAMILY MEDICINE CLINIC | Age: 23
End: 2018-03-30

## 2018-03-30 VITALS
HEIGHT: 62 IN | DIASTOLIC BLOOD PRESSURE: 79 MMHG | SYSTOLIC BLOOD PRESSURE: 124 MMHG | OXYGEN SATURATION: 96 % | WEIGHT: 114 LBS | RESPIRATION RATE: 18 BRPM | BODY MASS INDEX: 20.98 KG/M2 | TEMPERATURE: 97.8 F | HEART RATE: 68 BPM

## 2018-03-30 DIAGNOSIS — B34.9 VIRAL ILLNESS: Primary | ICD-10-CM

## 2018-03-31 NOTE — PROGRESS NOTES
HISTORY OF PRESENT ILLNESS  Gustavo Jama is a 23 y.o. female. Cold Symptoms   The history is provided by the patient. This is a new problem. The problem occurs constantly. The problem has not changed since onset. The cough is non-productive. There has been no fever. Pertinent negatives include no chest pain, no chills, no sweats, no weight loss, no ear congestion, no ear pain, no headaches, no sore throat, no myalgias, no shortness of breath, no wheezing, no nausea and no vomiting. She has tried nothing for the symptoms. She is not a smoker. Review of Systems   Constitutional: Negative for chills, fever and weight loss. HENT: Positive for congestion. Negative for ear pain, sinus pain and sore throat. Eyes: Negative for blurred vision, double vision, pain and discharge. Respiratory: Positive for cough. Negative for sputum production, shortness of breath and wheezing. Cardiovascular: Negative for chest pain, palpitations, claudication and leg swelling. Gastrointestinal: Negative for abdominal pain, blood in stool, diarrhea, nausea and vomiting. Genitourinary: Negative for dysuria and frequency. Musculoskeletal: Negative for joint pain, myalgias and neck pain. Neurological: Negative for tingling, tremors, focal weakness and headaches. Psychiatric/Behavioral: Negative for depression and hallucinations. The patient is not nervous/anxious. Visit Vitals    /79    Pulse 68    Temp 97.8 °F (36.6 °C) (Oral)    Resp 18    Ht 5' 2\" (1.575 m)    Wt 114 lb (51.7 kg)    SpO2 96%    BMI 20.85 kg/m2       Physical Exam   Constitutional: She is oriented to person, place, and time. She appears well-developed and well-nourished. No distress. HENT:   Head: Normocephalic and atraumatic. Right Ear: External ear normal.   Left Ear: External ear normal.   Mouth/Throat: Posterior oropharyngeal erythema present. Eyes: EOM are normal. Pupils are equal, round, and reactive to light.  No scleral icterus. Neck: Normal range of motion. No thyromegaly present. Cardiovascular: Normal rate, regular rhythm and normal heart sounds. Pulmonary/Chest: Effort normal and breath sounds normal. No respiratory distress. She has no wheezes. She has no rales. Abdominal: Soft. Bowel sounds are normal. She exhibits no distension. There is no tenderness. Lymphadenopathy:     She has no cervical adenopathy. Neurological: She is alert and oriented to person, place, and time. Psychiatric: She has a normal mood and affect. ASSESSMENT and PLAN  Viral illness:    - For aches and pain with fever more than 100.5 degrees it is ok to take tylenol.  - Rest and maintain good nutrition.  - Drink plenty of fluids to prevent dehydration.  - Ok to do salt water gargle for sore throat( 1/2 tsp of salt in 1 cup of water). - Can use over the counter decongestants with pseudoephedrine to clear nasal passages.  - Ok to use OTC cough medicine in moderation as discussed. - Supplements with vitam A,B,C can help. - Keep a humidifier on and can use a netti- pot / nasal saline spray.

## 2019-01-10 ENCOUNTER — OFFICE VISIT (OUTPATIENT)
Dept: FAMILY MEDICINE CLINIC | Age: 24
End: 2019-01-10

## 2019-01-10 VITALS
HEART RATE: 91 BPM | SYSTOLIC BLOOD PRESSURE: 131 MMHG | OXYGEN SATURATION: 99 % | DIASTOLIC BLOOD PRESSURE: 91 MMHG | HEIGHT: 63 IN | TEMPERATURE: 97.5 F | BODY MASS INDEX: 21.79 KG/M2 | WEIGHT: 123 LBS | RESPIRATION RATE: 18 BRPM

## 2019-01-10 DIAGNOSIS — Z23 ENCOUNTER FOR IMMUNIZATION: ICD-10-CM

## 2019-01-10 DIAGNOSIS — F31.9 BIPOLAR 1 DISORDER (HCC): ICD-10-CM

## 2019-01-10 DIAGNOSIS — G47.19 EXCESSIVE DAYTIME SLEEPINESS: Primary | ICD-10-CM

## 2019-01-10 NOTE — PROGRESS NOTES
HISTORY OF PRESENT ILLNESS Carrillo Acharya is a 20 y.o. female. Patient is here today as she was advised to have a sleep study by her psychiatrist. Patient mentions she sleeps for hours during the day time and has to do this or she is not able to function. Her bipolar disorder is currently stable and well controlled on medications. She does snore at night. Her blood pressure is stable and she has not had recent weight gain. Referral Request  
The history is provided by the patient. This is a new problem. Pertinent negatives include no chest pain, no abdominal pain and no shortness of breath. Nothing aggravates the symptoms. Nothing relieves the symptoms. She has tried nothing for the symptoms. Review of Systems Constitutional: Negative for chills, fever and malaise/fatigue. HENT: Negative for congestion, hearing loss and sinus pain. Eyes: Negative for blurred vision, double vision, pain and discharge. Respiratory: Negative for cough, shortness of breath and wheezing. Cardiovascular: Negative for chest pain, palpitations and leg swelling. Gastrointestinal: Negative for abdominal pain, constipation, nausea and vomiting. Genitourinary: Negative for dysuria, frequency and hematuria. Musculoskeletal: Positive for joint pain. Neurological: Negative for dizziness and focal weakness. Endo/Heme/Allergies: Positive for environmental allergies. Psychiatric/Behavioral: Positive for depression. Negative for hallucinations, substance abuse and suicidal ideas. The patient is nervous/anxious and has insomnia. Visit Vitals BP (!) 131/91 Pulse 91 Temp 97.5 °F (36.4 °C) (Oral) Resp 18 Ht 5' 3\" (1.6 m) Wt 123 lb (55.8 kg) SpO2 99% BMI 21.79 kg/m² Physical Exam  
Constitutional: She is oriented to person, place, and time. She appears well-developed and well-nourished. No distress. HENT:  
Head: Normocephalic and atraumatic.   
Right Ear: External ear normal.  
 Left Ear: External ear normal.  
Mouth/Throat: Oropharynx is clear and moist. No oropharyngeal exudate. Eyes: EOM are normal. Pupils are equal, round, and reactive to light. No scleral icterus. Neck: Normal range of motion. Thyromegaly present. Cardiovascular: Normal rate, regular rhythm and normal heart sounds. Pulmonary/Chest: Effort normal and breath sounds normal. No respiratory distress. She has no wheezes. Abdominal: Soft. Bowel sounds are normal. She exhibits no distension. There is no tenderness. Lymphadenopathy:  
  She has no cervical adenopathy. Neurological: She is alert and oriented to person, place, and time. Psychiatric: She has a normal mood and affect. ASSESSMENT and PLAN Excessive day time sleepiness: - Referral has been made I have discussed the need for the flu vaccine with patient and patient verbalizes an understanding. Vaccine has been given at today's visit

## 2019-02-05 ENCOUNTER — TELEPHONE (OUTPATIENT)
Dept: FAMILY MEDICINE CLINIC | Age: 24
End: 2019-02-05

## 2019-02-05 NOTE — TELEPHONE ENCOUNTER
Received fax transmission from [address] [place] [address]. Sent to be signed, sent, and scanned.

## 2019-07-17 ENCOUNTER — OFFICE VISIT (OUTPATIENT)
Dept: FAMILY MEDICINE CLINIC | Age: 24
End: 2019-07-17

## 2019-07-17 VITALS
HEIGHT: 63 IN | DIASTOLIC BLOOD PRESSURE: 77 MMHG | WEIGHT: 122 LBS | RESPIRATION RATE: 16 BRPM | OXYGEN SATURATION: 98 % | HEART RATE: 101 BPM | SYSTOLIC BLOOD PRESSURE: 112 MMHG | BODY MASS INDEX: 21.62 KG/M2 | TEMPERATURE: 98.2 F

## 2019-07-17 DIAGNOSIS — N62 LARGE BREASTS: ICD-10-CM

## 2019-07-17 DIAGNOSIS — G89.29 CHRONIC MIDLINE LOW BACK PAIN WITHOUT SCIATICA: Primary | ICD-10-CM

## 2019-07-17 DIAGNOSIS — M54.50 CHRONIC MIDLINE LOW BACK PAIN WITHOUT SCIATICA: Primary | ICD-10-CM

## 2019-07-17 NOTE — PROGRESS NOTES
HISTORY OF PRESENT ILLNESS  Twila Skiff is a 20 y.o. female. Patient is here for lower back pain and she mentions she feels this is due to her large breasts. I have discussed she does not need to have a reduction at present as her pain is not so bad. She does not exercise much but walks and bikes once a while. She is feeling well otherwise. She has been following with her kideny specialist and neurologist. She is currently using a cpap    Back Pain    The history is provided by the patient. This is a recurrent problem. The problem occurs every several days. Associated with: large breast  The pain is present in the middle back. The quality of the pain is described as shooting and aching. The pain does not radiate. The pain is at a severity of 6/10. The pain is moderate. Pertinent negatives include no chest pain, no fever, no numbness, no headaches, no abdominal pain, no abdominal swelling, no bowel incontinence, no dysuria, no pelvic pain, no leg pain, no paresthesias, no paresis, no tingling and no weakness. Review of Systems   Constitutional: Positive for malaise/fatigue. Negative for chills and fever. HENT: Negative for congestion, ear pain, hearing loss, sinus pain and sore throat. Eyes: Negative for blurred vision, double vision, pain and discharge. Respiratory: Negative for cough and shortness of breath. Cardiovascular: Negative for chest pain, palpitations and leg swelling. Gastrointestinal: Negative for abdominal pain, bowel incontinence, nausea and vomiting. Genitourinary: Negative for dysuria, frequency, hematuria and pelvic pain. Musculoskeletal: Positive for back pain. Neurological: Negative for tingling, sensory change, weakness, numbness, headaches and paresthesias. Endo/Heme/Allergies: Negative for environmental allergies. Psychiatric/Behavioral: Negative for depression, substance abuse and suicidal ideas. The patient is nervous/anxious and has insomnia.       Visit Vitals  /77   Pulse (!) 101   Temp 98.2 °F (36.8 °C) (Oral)   Resp 16   Ht 5' 3\" (1.6 m)   Wt 122 lb (55.3 kg)   SpO2 98%   BMI 21.61 kg/m²       Physical Exam   Constitutional: She is oriented to person, place, and time. She appears well-developed and well-nourished. HENT:   Head: Normocephalic and atraumatic. Right Ear: External ear normal.   Left Ear: External ear normal.   Mouth/Throat: No oropharyngeal exudate. Eyes: Pupils are equal, round, and reactive to light. EOM are normal. No scleral icterus. Neck: Normal range of motion. No thyromegaly present. Cardiovascular: Normal rate, regular rhythm and normal heart sounds. Pulmonary/Chest: Breath sounds normal. No respiratory distress. She has no wheezes. Abdominal: Bowel sounds are normal. She exhibits no distension. There is no tenderness. Lymphadenopathy:     She has no cervical adenopathy. Neurological: She is alert and oriented to person, place, and time. Skin: She is not diaphoretic. Psychiatric: She has a normal mood and affect. ASSESSMENT and PLAN  Lower back pain :  1) Please stop heavy weight lifting and aggressive exercise for a few days to allow healing to damaged nerve roots but it is important to maintain exercises to build strength and flexibility. 2) Consider physical therapy for 6-8 weeks before exploring further treatment options. 3) Ok to use hot /cold packs depending on your body. 4) Discussed the use of pain medication and muscle relaxants , please do not take more than the reccommended amount prescribed.    5) Please have x-ray of the lower back done if the pain gets worse

## 2022-03-18 PROBLEM — I10 ESSENTIAL HYPERTENSION: Status: ACTIVE | Noted: 2017-02-16

## 2022-03-18 PROBLEM — F84.0 AUTISM SPECTRUM DISORDER: Status: ACTIVE | Noted: 2017-02-16

## 2022-03-18 PROBLEM — F21 SCHIZOTYPAL DISORDER (HCC): Status: ACTIVE | Noted: 2017-02-16

## 2022-03-18 PROBLEM — Q62.5 DUPLICATED URINARY COLLECTING SYSTEM: Status: ACTIVE | Noted: 2017-02-16

## 2022-03-18 PROBLEM — A41.9 SEPSIS (HCC): Status: ACTIVE | Noted: 2017-08-17

## 2022-03-18 PROBLEM — Q63.9 CONGENITAL ANOMALY OF KIDNEY: Status: ACTIVE | Noted: 2017-07-24

## 2022-03-19 PROBLEM — F90.2 ATTENTION DEFICIT HYPERACTIVITY DISORDER (ADHD), COMBINED TYPE: Status: ACTIVE | Noted: 2017-02-16

## 2022-03-19 PROBLEM — F42.9 OCD (OBSESSIVE COMPULSIVE DISORDER): Status: ACTIVE | Noted: 2017-02-16

## 2022-03-19 PROBLEM — Z87.448 H/O KIDNEY DISEASE: Status: ACTIVE | Noted: 2017-02-16

## 2022-03-19 PROBLEM — F31.9 BIPOLAR 1 DISORDER (HCC): Status: ACTIVE | Noted: 2017-02-16

## 2022-03-19 PROBLEM — J18.9 PNEUMONIA: Status: ACTIVE | Noted: 2017-08-17

## 2022-03-19 PROBLEM — N39.0 RECURRENT UTI: Status: ACTIVE | Noted: 2017-02-16

## 2022-03-20 PROBLEM — N12 PYELONEPHRITIS: Status: ACTIVE | Noted: 2017-08-14

## 2023-01-31 RX ORDER — DEXTROMETHORPHAN HYDROBROMIDE AND QUINIDINE SULFATE 20; 10 MG/1; MG/1
1 CAPSULE, GELATIN COATED ORAL EVERY 12 HOURS
COMMUNITY

## 2023-01-31 RX ORDER — MODAFINIL 200 MG/1
200 TABLET ORAL 2 TIMES DAILY
COMMUNITY

## 2023-01-31 RX ORDER — BUSPIRONE HYDROCHLORIDE 10 MG/1
10 TABLET ORAL 3 TIMES DAILY
COMMUNITY

## 2023-01-31 RX ORDER — AMLODIPINE BESYLATE 5 MG/1
10 TABLET ORAL DAILY
COMMUNITY

## 2023-01-31 RX ORDER — LAMOTRIGINE 25 MG/1
25 TABLET ORAL 3 TIMES DAILY
COMMUNITY